# Patient Record
Sex: FEMALE | Race: WHITE
[De-identification: names, ages, dates, MRNs, and addresses within clinical notes are randomized per-mention and may not be internally consistent; named-entity substitution may affect disease eponyms.]

---

## 2022-06-21 DIAGNOSIS — M25.511 PAIN IN RIGHT SHOULDER: ICD-10-CM

## 2022-06-21 PROBLEM — Z00.00 ENCOUNTER FOR PREVENTIVE HEALTH EXAMINATION: Status: ACTIVE | Noted: 2022-06-21

## 2022-06-21 RX ORDER — TRAMADOL HYDROCHLORIDE 50 MG/1
50 TABLET, COATED ORAL EVERY 6 HOURS
Qty: 120 | Refills: 0 | Status: ACTIVE | COMMUNITY
Start: 2022-06-21 | End: 1900-01-01

## 2022-06-24 ENCOUNTER — APPOINTMENT (OUTPATIENT)
Dept: ORTHOPEDIC SURGERY | Facility: CLINIC | Age: 77
End: 2022-06-24

## 2022-06-24 VITALS — WEIGHT: 140 LBS | BODY MASS INDEX: 23.9 KG/M2 | HEIGHT: 64 IN

## 2022-06-24 PROCEDURE — 20610 DRAIN/INJ JOINT/BURSA W/O US: CPT | Mod: 50

## 2022-06-24 PROCEDURE — 99213 OFFICE O/P EST LOW 20 MIN: CPT | Mod: 25

## 2022-06-24 NOTE — PHYSICAL EXAM
[de-identified] :   Physical exam of her bilateral shoulders:  Negative AC joint tenderness bilaterally.  Positive glenohumeral joint tenderness bilaterally.  Positive subacromial space tenderness bilaterally.  Decreased active and passive range of motion of the shoulder secondary to pain bilaterally.  Negative drop-arm bilaterally.  Positive Speed, positive [place], positive impingement and  apprehension bilaterally.  Strength in her upper extremities are 5/5 with pain.  Sensory and motor are intact bilaterally.

## 2022-06-24 NOTE — DATA REVIEWED
[FreeTextEntry1] :   She had an MRI done yesterday of her right shoulder which confirmed bursitis condyle glenohumeral joint effusion and arthritic changes without any significant tear cuff tear.

## 2022-06-24 NOTE — DISCUSSION/SUMMARY
[de-identified] :   The patient is interested in cortisone injections.  The risks and benefits were explained.  She had a recently had her right shoulder which did help the pain a little bit.  Under sterile technique with the patient's consent, I injected 3 cc dexamethasone and 2 cc of 1% lidocaine into the glenohumeral joint space of her bilateral shoulders.  She tolerated this well.  The puncture sites cover the Band-Aid.  She was advised to ice and rest the area.  She will continue with therapy and follow up in the office in 4-6 weeks for repeat evaluation.  All her questions were answered today.

## 2022-06-27 RX ORDER — TRAMADOL HYDROCHLORIDE 50 MG/1
50 TABLET, COATED ORAL EVERY 4 HOURS
Qty: 60 | Refills: 0 | Status: ACTIVE | COMMUNITY
Start: 2022-06-27 | End: 1900-01-01

## 2022-07-28 ENCOUNTER — APPOINTMENT (OUTPATIENT)
Dept: ORTHOPEDIC SURGERY | Facility: CLINIC | Age: 77
End: 2022-07-28

## 2022-07-28 PROCEDURE — 99203 OFFICE O/P NEW LOW 30 MIN: CPT

## 2022-07-28 RX ORDER — DICLOFENAC SODIUM 75 MG/1
75 TABLET, DELAYED RELEASE ORAL
Qty: 60 | Refills: 0 | Status: ACTIVE | COMMUNITY
Start: 2022-07-28 | End: 1900-01-01

## 2022-08-01 NOTE — HISTORY OF PRESENT ILLNESS
[de-identified] : Patient is here for evaluation of right shoulder pain\par Affecting quality of life\par Wakes up at night due to pain\par has had injections\par \par NAD\par Right shoulder:\par TTP ant GH joint, bicipital groove\par FF 0-175\par ER 60\par IR T12\par 5/5 strength scapular abduction, ER, IR\par Pos Impingement\par Pos Tomas\par Pos Cross Arm Adduction\par Negative instability\par \par XRay right shoulder negative for fracture, dislocation, arthritis\par \par mri right shoulder capsulitis, bursitis, pRCT\par \par Plan\par went over mri\par explaiend tx options\par cont cons tx\par pt\par hep\par fu in 2 months\par

## 2022-08-11 ENCOUNTER — APPOINTMENT (OUTPATIENT)
Dept: ORTHOPEDIC SURGERY | Facility: CLINIC | Age: 77
End: 2022-08-11

## 2023-09-30 ENCOUNTER — NON-APPOINTMENT (OUTPATIENT)
Age: 78
End: 2023-09-30

## 2024-01-18 ENCOUNTER — APPOINTMENT (OUTPATIENT)
Dept: ORTHOPEDIC SURGERY | Facility: CLINIC | Age: 79
End: 2024-01-18
Payer: MEDICARE

## 2024-01-18 PROCEDURE — 20611 DRAIN/INJ JOINT/BURSA W/US: CPT | Mod: 50

## 2024-01-18 PROCEDURE — 73030 X-RAY EXAM OF SHOULDER: CPT | Mod: 50

## 2024-01-18 PROCEDURE — 99214 OFFICE O/P EST MOD 30 MIN: CPT | Mod: 25

## 2024-01-18 NOTE — HISTORY OF PRESENT ILLNESS
[de-identified] : Patient is here for evaluation of ania shoulder pain Affecting quality of life Wakes up at night due to pain  NAD Right shoulder: TTP ant GH joint, bicipital groove FF 0-175 ER 60 IR T12 5/5 strength scapular abduction, ER, IR Pos Impingement Pos Tomas Pos Cross Arm Adduction Negative instability  XRay right shoulder negative for fracture, dislocation, arthritis  Plan went over findings explained the imaging needs mri ania joseudler as she has failed sig cons tx ania shoudler injections cont hep fu after mri's  Large Joint Injection was performed because of pain/rom. Anesthesia: ethyl chloride sprayed topically. Dexamethasone 2 cc of 4mg.   Lidocaine: 2 cc of 1%  Medication was injected in the bilateral shoulder. Patient has tried OTC's including aspirin, Ibuprofen, Aleve etc or prescription NSAIDS, and/or exercises at home and/ or physical therapy without satisfactory response. After verbal consent using sterile preparation and technique. The risks, benefits, and alternatives to cortisone injection were explained in full to the patient. Risks outlined include but are not limited to infection, sepsis, bleeding, scarring, skin discoloration, temporary increase in pain, syncopal episode, failure to resolve symptoms, allergic reaction, symptom recurrence, and elevation of blood sugar in diabetics. Patient understood the risks. All questions were answered. After discussion of options, patient requested an injection. Oral informed consent was obtained and sterile prep was done of the injection site. Sterile technique was utilized for the procedure including the preparation of the solutions used for the injection. Patient tolerated the procedure well. Advised to ice the injection site this evening. Prep with alcohol locally to site. Sterile technique used. Diagnostic ultrasound was performed of the shoulder to confirm.

## 2024-02-09 ENCOUNTER — APPOINTMENT (OUTPATIENT)
Dept: ORTHOPEDIC SURGERY | Facility: CLINIC | Age: 79
End: 2024-02-09
Payer: MEDICARE

## 2024-02-09 DIAGNOSIS — M25.512 PAIN IN LEFT SHOULDER: ICD-10-CM

## 2024-02-09 PROCEDURE — 99213 OFFICE O/P EST LOW 20 MIN: CPT

## 2024-02-09 NOTE — HISTORY OF PRESENT ILLNESS
[de-identified] : Patient is here for evaluation of ania shoulder pain Affecting quality of life Wakes up at night due to pain  ania shoulder injections did not help  NAD Right shoulder: TTP ant GH joint, bicipital groove FF 0-175 ER 60 IR T12 5/5 strength scapular abduction, ER, IR Pos Impingement Pos Tomas Pos Cross Arm Adduction Negative instability  XRay right shoulder negative for fracture, dislocation, arthritis  mri right shoulder: capsulitis, low grade pRCT mri left shoulder: capsulitis, low grade pRCT, labral tear   Plan went over findings explained the imaging went over mri's nonop vs op will fu with rheum for pain control fu in 2 months .

## 2024-02-12 ENCOUNTER — APPOINTMENT (OUTPATIENT)
Dept: PAIN MANAGEMENT | Facility: CLINIC | Age: 79
End: 2024-02-12
Payer: MEDICARE

## 2024-02-12 DIAGNOSIS — M25.511 PAIN IN RIGHT SHOULDER: ICD-10-CM

## 2024-02-12 PROCEDURE — G2211 COMPLEX E/M VISIT ADD ON: CPT

## 2024-02-12 PROCEDURE — 99204 OFFICE O/P NEW MOD 45 MIN: CPT

## 2024-02-12 RX ORDER — LEVOTHYROXINE SODIUM 100 UG/1
100 CAPSULE ORAL
Refills: 0 | Status: ACTIVE | COMMUNITY

## 2024-02-12 RX ORDER — DICLOFENAC SODIUM 1% 10 MG/G
1 GEL TOPICAL DAILY
Qty: 3 | Refills: 3 | Status: ACTIVE | COMMUNITY
Start: 2024-02-12 | End: 1900-01-01

## 2024-02-12 RX ORDER — MELOXICAM 15 MG/1
15 TABLET ORAL
Qty: 30 | Refills: 0 | Status: ACTIVE | COMMUNITY
Start: 2024-02-12 | End: 1900-01-01

## 2024-02-12 RX ORDER — LISINOPRIL 20 MG/1
20 TABLET ORAL
Refills: 0 | Status: ACTIVE | COMMUNITY

## 2024-02-12 RX ORDER — ATORVASTATIN CALCIUM 20 MG/1
20 TABLET, FILM COATED ORAL
Refills: 0 | Status: ACTIVE | COMMUNITY

## 2024-02-12 NOTE — HISTORY OF PRESENT ILLNESS
[FreeTextEntry1] : HISTORY OF PRESENT ILLNESS: Mrs. Hdez is a 78-year-old female complaining of BL shoulder right worse than left pain. The pain started after no specific injury or event.  The patient has had this pain for months. Patient describes the pain as moderate to severe.  During the last month the pain has been nearly constant with symptoms worsening in no typical pattern. The pain has been impeding her QOL. She also complains of right-hand pain mainly in between her thumb.   Patient admits to BL right worse than left dull, achy pain with overhead activity without associated numbness, tingling or radicular pain past the elbow.). Patient also has pain with laying on his affected side. Pain is 8/10 in intensity. She has tried NSAIDs and PT without significant relief. Pain is not changed with coughing sneezing or bowel movements.  Bowel or bladder habits have not changed.    ACTIVITIES: Patient uses no assisted walking device at this time.  Patient has difficulty performing household chores, going to work, doing yardwork or shopping, participating in recreational activities & exercise at this time.   PRIOR PAIN TREATMENTS: No relief with BL shoulder injections.   Prior Pain Medications: Ibuprofen, Aleve, Diclofenac, Tramadol.

## 2024-02-12 NOTE — DISCUSSION/SUMMARY
[de-identified] : A discussion regarding available pain management treatment options occurred with the patient. These included interventional, rehabilitative, pharmacological, and alternative modalities. We will proceed with the following:   Interventional treatment options: - Potential treatment options such as further conservative therapy, injections, and surgery were briefly discussed.   Rehabilitative options: -Participation in HEP were discussed and printed.   Medication based treatment options: - ordered Meloxicam 15mg daily for 4 weeks. Discussed risks and benefits. Avoid taking for any side effects. patient is aware to take for 2 weeks straight than take 1 week off before repeating. - ordered Diclofenac 1% gel, apply topically to affected area as directed for a duration of 4 weeks.    Complementary treatment options: - Patient was advised to stay away from any heavy lifting.  - Initiate physician directed activity and lifestyle modifications.  Follow up in 4 weeks for reassessment.   I, Wayne Kim, attest that this documentation has been prepared under the direction and in the presence of Provider Trenton Orlando, DO The documentation recorded by the scribe, in my presence, accurately reflects the service I personally performed, and the decisions made by me with my edits as appropriate.  Best Regards, Trenton Orlando D.O.

## 2024-03-11 ENCOUNTER — APPOINTMENT (OUTPATIENT)
Dept: PAIN MANAGEMENT | Facility: CLINIC | Age: 79
End: 2024-03-11

## 2024-04-12 ENCOUNTER — APPOINTMENT (OUTPATIENT)
Dept: ORTHOPEDIC SURGERY | Facility: CLINIC | Age: 79
End: 2024-04-12